# Patient Record
Sex: FEMALE | Race: WHITE | URBAN - METROPOLITAN AREA
[De-identification: names, ages, dates, MRNs, and addresses within clinical notes are randomized per-mention and may not be internally consistent; named-entity substitution may affect disease eponyms.]

---

## 2017-07-26 NOTE — PROGRESS NOTES
"  SUBJECTIVE:                                                    Christy Rodriguez is a 47 year old female who presents to clinic today for the following health issues:      Hypothyroidism Follow-up  /68  Pulse 64  Temp 98.4  F (36.9  C) (Temporal)  Resp 14  Ht 5' 2\" (1.575 m)  Wt 112 lb 4.8 oz (50.9 kg)  LMP 07/13/2017 (Approximate)  Breastfeeding? No  BMI 20.54 kg/m2      Since last visit, patient describes the following symptoms: Weight stable, no hair loss, no skin changes, no constipation, no loose stools      Amount of exercise or physical activity: 1-2 day/week for an average of 15-30 minutes    Problems taking medications regularly: No    Medication side effects: none    Diet: regular (no restrictions)    Hyperthyroid 1998 and treated.  Hypothyroid since 2000.  Last dose change from 150 mcg to 100 mcg.  Doing well overall, no questions or concerns.  Family hx of Thyroid problems.  No other positive family hx.  Denies surgical hx or other medical condition.  Mamogram done recently per patient.  Needs PAP, will call and schedule.  Patient works at home, , 3 children 17,15 and 13.  No tobacco, minimum alcohol, no drugs.  Regular menstural periods.    PROBLEMS TO ADD ON...    Problem list and histories reviewed & adjusted, as indicated.  Additional history: as documented    Patient Active Problem List   Diagnosis     Postablative hypothyroidism     History reviewed. No pertinent surgical history.    Social History   Substance Use Topics     Smoking status: Never Smoker     Smokeless tobacco: Never Used     Alcohol use Yes      Comment: socially      History reviewed. No pertinent family history.      Current Outpatient Prescriptions   Medication Sig Dispense Refill     LEVOTHYROXINE SODIUM PO Take 100 mcg by mouth daily       No Known Allergies      Reviewed and updated as needed this visit by clinical staffTobacco  Allergies  Meds  Med Hx  Surg Hx  Fam Hx  Soc Hx      Reviewed and " "updated as needed this visit by Provider         ROS:  Constitutional, HEENT, cardiovascular, pulmonary, gi and gu systems are negative, except as otherwise noted.      OBJECTIVE:   /68  Pulse 64  Temp 98.4  F (36.9  C) (Temporal)  Resp 14  Ht 5' 2\" (1.575 m)  Wt 112 lb 4.8 oz (50.9 kg)  LMP 07/13/2017 (Approximate)  Breastfeeding? No  BMI 20.54 kg/m2  Body mass index is 20.54 kg/(m^2).  GENERAL: no apparent distress  EYES: Conjunctiva are not injected, no discharge.  EARS: Left TM -no erythema, no effusion,  not bulged.               Right TM -no erythema, no effusion,  not bulged.  NOSE: no discharge, no sinus tenderness  THROAT: no erythema, no exudate, no lesions  NECK: supple, no adenopathy.  CARDIAC: regular rate and rhythm, no murmur  RESP: clear, no wheezing, no rales, no rhonchi  ABD: soft, no distension, no tenderness  SKIN: normal, warm, no rash, nml skin turgor    Diagnostic Test Results:  Pending    ASSESSMENT/PLAN:     Hypothyroidism; controlled/euthyroid   Plan:  Labs:  TSH, Lipid, hgb, glucose and Vit D  Medications:  continue levothyroxine at current dose, will wait for the result    Tobacco Cessation:   reports that she has never smoked. She has never used smokeless tobacco.      BMI:   Estimated body mass index is 20.54 kg/(m^2) as calculated from the following:    Height as of this encounter: 5' 2\" (1.575 m).    Weight as of this encounter: 112 lb 4.8 oz (50.9 kg).   Per patient her weight has been stable, not worried about weight and appetite good and healthy diet.        ICD-10-CM    1. Postablative hypothyroidism E89.0 Lipid Profile (Chol, Trig, HDL, LDL calc)     LEVOTHYROXINE SODIUM PO     TSH     Hemoglobin     Glucose, whole blood     Vitamin D Deficiency     The patient understood the rational for the diagnosis and treatment plan.   All questions were answered to best of my ability and the patient's satisfaction.  I have reviewed all pertinent investigations including labs " and imaging including outside records if relevent.    Patient Instructions   Will call and update with results.  Keep up healthy life style, balance diet and healthy life style.  If any questions or concerns please notify or follow up.  Follow up TSH in 6 months if labs normal.    Thank you.    Electronically signed:  Malu Jiménez M.D.        Malu Jiménez MD  [facility]

## 2017-07-27 ENCOUNTER — OFFICE VISIT (OUTPATIENT)
Dept: FAMILY MEDICINE | Facility: OTHER | Age: 47
End: 2017-07-27
Payer: COMMERCIAL

## 2017-07-27 VITALS
DIASTOLIC BLOOD PRESSURE: 68 MMHG | SYSTOLIC BLOOD PRESSURE: 106 MMHG | RESPIRATION RATE: 14 BRPM | BODY MASS INDEX: 20.67 KG/M2 | WEIGHT: 112.3 LBS | HEIGHT: 62 IN | TEMPERATURE: 98.4 F | HEART RATE: 64 BPM

## 2017-07-27 DIAGNOSIS — E89.0 POSTABLATIVE HYPOTHYROIDISM: Primary | ICD-10-CM

## 2017-07-27 LAB
CHOLEST SERPL-MCNC: 185 MG/DL
GLUCOSE BLD-MCNC: 95 MG/DL (ref 70–99)
HDLC SERPL-MCNC: 83 MG/DL
HGB BLD-MCNC: 14.3 G/DL (ref 11.7–15.7)
LDLC SERPL CALC-MCNC: 90 MG/DL
NONHDLC SERPL-MCNC: 102 MG/DL
TRIGL SERPL-MCNC: 61 MG/DL
TSH SERPL DL<=0.05 MIU/L-ACNC: 1.8 MU/L (ref 0.4–4)

## 2017-07-27 PROCEDURE — 82306 VITAMIN D 25 HYDROXY: CPT | Performed by: FAMILY MEDICINE

## 2017-07-27 PROCEDURE — 84443 ASSAY THYROID STIM HORMONE: CPT | Performed by: FAMILY MEDICINE

## 2017-07-27 PROCEDURE — 99202 OFFICE O/P NEW SF 15 MIN: CPT | Performed by: FAMILY MEDICINE

## 2017-07-27 PROCEDURE — 82947 ASSAY GLUCOSE BLOOD QUANT: CPT | Performed by: FAMILY MEDICINE

## 2017-07-27 PROCEDURE — 85018 HEMOGLOBIN: CPT | Performed by: FAMILY MEDICINE

## 2017-07-27 PROCEDURE — 80061 LIPID PANEL: CPT | Performed by: FAMILY MEDICINE

## 2017-07-27 PROCEDURE — 36415 COLL VENOUS BLD VENIPUNCTURE: CPT | Performed by: FAMILY MEDICINE

## 2017-07-27 ASSESSMENT — PAIN SCALES - GENERAL: PAINLEVEL: NO PAIN (0)

## 2017-07-27 NOTE — NURSING NOTE
"Chief Complaint   Patient presents with     Thyroid Disease     Panel Management     pap, ldl, mychart, sameer       Initial /68  Pulse 64  Temp 98.4  F (36.9  C) (Temporal)  Resp 14  Ht 5' 2\" (1.575 m)  Wt 112 lb 4.8 oz (50.9 kg)  LMP 07/13/2017 (Approximate)  Breastfeeding? No  BMI 20.54 kg/m2 Estimated body mass index is 20.54 kg/(m^2) as calculated from the following:    Height as of this encounter: 5' 2\" (1.575 m).    Weight as of this encounter: 112 lb 4.8 oz (50.9 kg).  Medication Reconciliation: complete     Felicia Clark MA    "

## 2017-07-27 NOTE — PATIENT INSTRUCTIONS
Will call and update with results.  Keep up healthy life style, balance diet and healthy life style.  If any questions or concerns please notify or follow up.  Follow up TSH in 6 months if labs normal.    Thank you.    Electronically signed:  Malu Jiménez M.D.

## 2017-07-27 NOTE — MR AVS SNAPSHOT
"              After Visit Summary   7/27/2017    Christy Rodriguez    MRN: [id number]           Patient Information     Date Of Birth          1970        Visit Information        Provider Department      7/27/2017 8:10 AM Malu Jiménez MD [facility]        Today's Diagnoses     Postablative hypothyroidism    -  1      Care Instructions    Will call and update with results.  Keep up healthy life style, balance diet and healthy life style.  If any questions or concerns please notify or follow up.  Follow up TSH in 6 months if labs normal.    Thank you.    Electronically signed:  Malu Jiménez M.D.            Follow-ups after your visit        Who to contact     If you have questions or need follow up information about today's clinic visit or your schedule please contact [facility] directly at [phone].  Normal or non-critical lab and imaging results will be communicated to you by MyChart, letter or phone within 4 business days after the clinic has received the results. If you do not hear from us within 7 days, please contact the clinic through MyChart or phone. If you have a critical or abnormal lab result, we will notify you by phone as soon as possible.  Submit refill requests through Ryonet or call your pharmacy and they will forward the refill request to us. Please allow 3 business days for your refill to be completed.          Additional Information About Your Visit        MyChart Information     Ryonet lets you send messages to your doctor, view your test results, renew your prescriptions, schedule appointments and more. To sign up, go to [email or web address] . Click on \"Log in\" on the left side of the screen, which will take you to the Welcome page. Then click on \"Sign up Now\" on the right side of the page.     You will be asked to enter the access code listed below, as well as some personal information. Please follow the directions to create your " "username and password.     Your access code is: [email or web address]  Expires: 10/25/2017  8:37 AM     Your access code will  in 90 days. If you need help or a new code, please call your [facility] or [phone].        Care EveryWhere ID     This is your Care EveryWhere ID. This could be used by other organizations to access your [place] medical records  [id number]        Your Vitals Were     Pulse Temperature Respirations Height Last Period Breastfeeding?    64 98.4  F (36.9  C) (Temporal) 14 5' 2\" (1.575 m) 2017 (Approximate) No    BMI (Body Mass Index)                   20.54 kg/m2            Blood Pressure from Last 3 Encounters:   17 106/68    Weight from Last 3 Encounters:   17 112 lb 4.8 oz (50.9 kg)              We Performed the Following     Glucose, whole blood     Hemoglobin     Lipid Profile (Chol, Trig, HDL, LDL calc)     TSH        Primary Care Provider Office Phone # Fax #    Malu Jiménez [phone]       [facility] [address] [facility] [address]        Equal Access to Services     LOVE GONZÁLES : Hadii mello ku hadasho Soomaali, waaxda luqadaha, qaybta kaalmada adeegyada, ilir grewal. So [facility] [phone].    ATENCIÓN: Si habla español, tiene a valle disposición servicios gratuitos de asistencia lingüística. Llame al [phone].    We comply with applicable federal civil rights laws and [place] laws. We do not discriminate on the basis of race, color, national origin, age, disability sex, sexual orientation or gender identity.            Thank you!     Thank you for choosing [facility]  for your care. Our goal is always to provide you with excellent care. Hearing back from our patients is one way we can continue to improve our services. Please take a few minutes to complete the written survey that you may receive in the mail after your visit with us. Thank you!             Your Updated " Medication List - Protect others around you: Learn how to safely use, store and throw away your medicines at www.disposemymeds.org.          This list is accurate as of: 7/27/17  8:37 AM.  Always use your most recent med list.                   Brand Name Dispense Instructions for use Diagnosis    LEVOTHYROXINE SODIUM PO      Take 100 mcg by mouth daily    Postablative hypothyroidism

## 2017-07-28 LAB — DEPRECATED CALCIDIOL+CALCIFEROL SERPL-MC: 28 UG/L (ref 20–75)

## 2017-07-28 RX ORDER — LEVOTHYROXINE SODIUM 100 UG/1
100 TABLET ORAL DAILY
Qty: 90 TABLET | Refills: 3 | Status: SHIPPED | OUTPATIENT
Start: 2017-07-28 | End: 2018-08-20

## 2017-07-29 ENCOUNTER — HEALTH MAINTENANCE LETTER (OUTPATIENT)
Age: 47
End: 2017-07-29

## 2017-09-12 ENCOUNTER — TELEPHONE (OUTPATIENT)
Dept: FAMILY MEDICINE | Facility: OTHER | Age: 47
End: 2017-09-12

## 2017-09-12 NOTE — TELEPHONE ENCOUNTER
Summary:    Patient is due/failing the following:   PAP and Physical     Action needed:   Patient needs office visit for PAP.    Type of outreach:    Phone, spoke to patient.  patient scheduled    Questions for provider review:    None                                                                                                                                    Aster Reeder       Chart routed to Care Team .        Panel Management Review      Patient has the following on her problem list: None      Composite cancer screening  Chart review shows that this patient is due/due soon for the following Pap Smear

## 2017-09-15 NOTE — PROGRESS NOTES
SUBJECTIVE:   CC: Christy Rodriguez is an 47 year old woman who presents for preventive health visit.     Physical   Annual:     Getting at least 3 servings of Calcium per day::  NO    Bi-annual eye exam::  Yes    Dental care twice a year::  Yes    Sleep apnea or symptoms of sleep apnea::  None    Diet::  Regular (no restrictions)    Frequency of exercise::  2-3 days/week    Duration of exercise::  30-45 minutes    Taking medications regularly::  Yes    Medication side effects::  None    Additional concerns today::  No            Today's PHQ-2 Score: No flowsheet data found.    Abuse: Current or Past(Physical, Sexual or Emotional)- No  Do you feel safe in your environment - Yes    Social History   Substance Use Topics     Smoking status: Never Smoker     Smokeless tobacco: Never Used     Alcohol use Yes      Comment: socially      The patient does not drink >3 drinks per day nor >7 drinks per week.    Reviewed orders with patient.  Reviewed health maintenance and updated orders accordingly - Yes  Labs reviewed in EPIC  BP Readings from Last 3 Encounters:   09/26/17 90/62   07/27/17 106/68    Wt Readings from Last 3 Encounters:   09/26/17 114 lb 3.2 oz (51.8 kg)   07/27/17 112 lb 4.8 oz (50.9 kg)                  Patient Active Problem List   Diagnosis     Postablative hypothyroidism     Past Surgical History:   Procedure Laterality Date     C THYROID ABLATION       HC TOOTH EXTRACTION W/FORCEP       TONSILLECTOMY & ADENOIDECTOMY         Social History   Substance Use Topics     Smoking status: Never Smoker     Smokeless tobacco: Never Used     Alcohol use Yes      Comment: socially      No family history on file.      Current Outpatient Prescriptions   Medication Sig Dispense Refill     levothyroxine (SYNTHROID/LEVOTHROID) 100 MCG tablet Take 1 tablet (100 mcg) by mouth daily 90 tablet 3     [DISCONTINUED] LEVOTHYROXINE SODIUM PO Take 100 mcg by mouth daily             Patient under age 50, mutual decision  reflected in health maintenance.        Pertinent mammograms are reviewed under the imaging tab.  History of abnormal Pap smear: Last 3 Pap Results: No results found for: PAP    Reviewed and updated as needed this visit by clinical staff         Reviewed and updated as needed this visit by Provider        Past Medical History:   Diagnosis Date     Postablative hypothyroidism 7/27/2017      Past Surgical History:   Procedure Laterality Date     C THYROID ABLATION       HC TOOTH EXTRACTION W/FORCEP       TONSILLECTOMY & ADENOIDECTOMY         ROS:  C: NEGATIVE for fever, chills, change in weight  I: NEGATIVE for worrisome rashes, moles or lesions  E: NEGATIVE for vision changes or irritation  ENT: NEGATIVE for ear, mouth and throat problems  R: NEGATIVE for significant cough or SOB  B: NEGATIVE for masses, tenderness or discharge  CV: NEGATIVE for chest pain, palpitations or peripheral edema  GI: NEGATIVE for nausea, abdominal pain, heartburn, or change in bowel habits  : NEGATIVE for unusual urinary or vaginal symptoms. Periods are regular.  M: NEGATIVE for significant arthralgias or myalgia  N: NEGATIVE for weakness, dizziness or paresthesias  P: NEGATIVE for changes in mood or affect     OBJECTIVE:   There were no vitals taken for this visit.  EXAM:  GENERAL: healthy, alert and no distress  EYES: Eyes grossly normal to inspection, PERRL and conjunctivae and sclerae normal  HENT: ear canals and TM's normal, nose and mouth without ulcers or lesions  NECK: no adenopathy, no asymmetry, masses, or scars and thyroid normal to palpation  RESP: lungs clear to auscultation - no rales, rhonchi or wheezes  BREAST: normal without masses, tenderness or nipple discharge and no palpable axillary masses or adenopathy  CV: regular rate and rhythm, normal S1 S2, no S3 or S4, no murmur, click or rub, no peripheral edema and peripheral pulses strong  ABDOMEN: soft, nontender, no hepatosplenomegaly, no masses and bowel sounds  "normal   (female): normal female external genitalia, normal urethral meatus , vaginal mucosa pink, moist, well rugated and Bartholin's gland friable cervix is noted at this time.  MS: no gross musculoskeletal defects noted, no edema  SKIN: no suspicious lesions or rashes  NEURO: Normal strength and tone, mentation intact and speech normal  PSYCH: mentation appears normal, affect normal/bright    ASSESSMENT/PLAN:   1. Postablative hypothyroidism  Needs labs in February.      COUNSELING:  Reviewed preventive health counseling, as reflected in patient instructions       Regular exercise       Healthy diet/nutrition       Vision screening       Hearing screening         reports that she has never smoked. She has never used smokeless tobacco.    Estimated body mass index is 20.54 kg/(m^2) as calculated from the following:    Height as of 7/27/17: 5' 2\" (1.575 m).    Weight as of 7/27/17: 112 lb 4.8 oz (50.9 kg).         Counseling Resources:  ATP IV Guidelines  Pooled Cohorts Equation Calculator  Breast Cancer Risk Calculator  FRAX Risk Assessment  ICSI Preventive Guidelines  Dietary Guidelines for Americans, 2010  USDA's MyPlate  ASA Prophylaxis  Lung CA Screening    Raf Cardenas PA-C  [facility] for HPI/ROS submitted by the patient on 9/26/2017   PHQ-2 Score: 0    "

## 2017-09-26 ENCOUNTER — OFFICE VISIT (OUTPATIENT)
Dept: FAMILY MEDICINE | Facility: OTHER | Age: 47
End: 2017-09-26
Payer: COMMERCIAL

## 2017-09-26 VITALS
BODY MASS INDEX: 21.02 KG/M2 | HEART RATE: 64 BPM | DIASTOLIC BLOOD PRESSURE: 62 MMHG | SYSTOLIC BLOOD PRESSURE: 90 MMHG | HEIGHT: 62 IN | RESPIRATION RATE: 18 BRPM | WEIGHT: 114.2 LBS

## 2017-09-26 DIAGNOSIS — Z01.419 WELL WOMAN EXAM WITH ROUTINE GYNECOLOGICAL EXAM: ICD-10-CM

## 2017-09-26 DIAGNOSIS — E89.0 POSTABLATIVE HYPOTHYROIDISM: Primary | ICD-10-CM

## 2017-09-26 LAB
SPECIMEN SOURCE: NORMAL
WET PREP SPEC: NORMAL

## 2017-09-26 PROCEDURE — G0145 SCR C/V CYTO,THINLAYER,RESCR: HCPCS | Performed by: PHYSICIAN ASSISTANT

## 2017-09-26 PROCEDURE — 87210 SMEAR WET MOUNT SALINE/INK: CPT | Performed by: PHYSICIAN ASSISTANT

## 2017-09-26 PROCEDURE — 87624 HPV HI-RISK TYP POOLED RSLT: CPT | Performed by: PHYSICIAN ASSISTANT

## 2017-09-26 PROCEDURE — 99396 PREV VISIT EST AGE 40-64: CPT | Performed by: PHYSICIAN ASSISTANT

## 2017-09-26 ASSESSMENT — PAIN SCALES - GENERAL: PAINLEVEL: NO PAIN (0)

## 2017-09-26 NOTE — MR AVS SNAPSHOT
After Visit Summary   9/26/2017    Christy Rodriguez    MRN: [id number]           Patient Information     Date Of Birth          1970        Visit Information        Provider Department      9/26/2017 3:00 PM Raf Guerrero PA-C [facility]'s Diagnoses     Postablative hypothyroidism          Care Instructions      Preventive Health Recommendations  Female Ages 40 to 49    Yearly exam:     See your health care provider every year in order to  1. Review health changes.   2. Discuss preventive care.    3. Review your medicines if your doctor prescribed any.      Get a Pap test every three years (unless you have an abnormal result and your provider advises testing more often).      If you get Pap tests with HPV test, you only need to test every 5 years, unless you have an abnormal result. You do not need a Pap test if your uterus was removed (hysterectomy) and you have not had cancer.      You should be tested each year for STDs (sexually transmitted diseases), if you're at risk.       Ask your doctor if you should have a mammogram.      Have a colonoscopy (test for colon cancer) if someone in your family has had colon cancer or polyps before age 50.       Have a cholesterol test every 5 years.       Have a diabetes test (fasting glucose) after age 45. If you are at risk for diabetes, you should have this test every 3 years.    Shots: Get a flu shot each year. Get a tetanus shot every 10 years.     Nutrition:     Eat at least 5 servings of fruits and vegetables each day.    Eat whole-grain bread, whole-wheat pasta and brown rice instead of white grains and rice.    Talk to your provider about Calcium and Vitamin D.     Lifestyle    Exercise at least 150 minutes a week (an average of 30 minutes a day, 5 days a week). This will help you control your weight and prevent disease.    Limit alcohol to one drink per day.    No smoking.     Wear sunscreen to prevent skin  "cancer.    See your dentist every six months for an exam and cleaning.          Follow-ups after your visit        Who to contact     If you have questions or need follow up information about today's clinic visit or your schedule please contact [facility] directly at [phone].  Normal or non-critical lab and imaging results will be communicated to you by MyChart, letter or phone within 4 business days after the clinic has received the results. If you do not hear from us within 7 days, please contact the clinic through MyChart or phone. If you have a critical or abnormal lab result, we will notify you by phone as soon as possible.  Submit refill requests through IQ Elite or call your pharmacy and they will forward the refill request to us. Please allow 3 business days for your refill to be completed.          Additional Information About Your Visit        MyChart Information     IQ Elite lets you send messages to your doctor, view your test results, renew your prescriptions, schedule appointments and more. To sign up, go to [email or web address] . Click on \"Log in\" on the left side of the screen, which will take you to the Welcome page. Then click on \"Sign up Now\" on the right side of the page.     You will be asked to enter the access code listed below, as well as some personal information. Please follow the directions to create your username and password.     Your access code is: [email or web address]  Expires: 10/25/2017  8:37 AM     Your access code will  in 90 days. If you need help or a new code, please call your [place] clinic or [phone].        Care EveryWhere ID     This is your Care EveryWhere ID. This could be used by other organizations to access your [place] medical records  [id number]        Your Vitals Were     Pulse Respirations Height Last Period BMI (Body Mass Index)       64 18 5' 1.69\" (1.567 m) 2017 (Approximate) 21.1 kg/m2        Blood Pressure from Last 3 " Encounters:   09/26/17 90/62   07/27/17 106/68    Weight from Last 3 Encounters:   09/26/17 114 lb 3.2 oz (51.8 kg)   07/27/17 112 lb 4.8 oz (50.9 kg)              Today, you had the following     No orders found for display       Primary Care Provider Office Phone # Fax #    Raf Guerrero PA-C [phone]       [address] [facility] [address]        Equal Access to Services     LOVE GONZÁLES : Hadii aad ku hadasho Soomaali, waaxda luqadaha, qaybta kaalmada adeegyada, waxay idiin hayaan adeeg justin becker . So [facility] [phone].    ATENCIÓN: Si habla español, tiene a valle disposición servicios gratuitos de asistencia lingüística. [facility] [phone].    We comply with applicable federal civil rights laws and [place] laws. We do not discriminate on the basis of race, color, national origin, age, disability sex, sexual orientation or gender identity.            Thank you!     Thank you for choosing [facility]  for your care. Our goal is always to provide you with excellent care. Hearing back from our patients is one way we can continue to improve our services. Please take a few minutes to complete the written survey that you may receive in the mail after your visit with us. Thank you!             Your Updated Medication List - Protect others around you: Learn how to safely use, store and throw away your medicines at www.disposemymeds.org.          This list is accurate as of: 9/26/17  3:32 PM.  Always use your most recent med list.                   Brand Name Dispense Instructions for use Diagnosis    levothyroxine 100 MCG tablet    SYNTHROID/LEVOTHROID    90 tablet    Take 1 tablet (100 mcg) by mouth daily    Postablative hypothyroidism

## 2017-09-26 NOTE — LETTER
October 6, 2017    Christy Rodriguez     [facility] [address]    Dear Christy,  We are happy to inform you that your PAP smear result from 09/26/17 is normal.  We are now able to do a follow up test on PAP smears. The DNA test is for HPV (Human Papilloma Virus). Cervical cancer is closely linked with certain types of HPV. Your result showed no evidence of high risk HPV.  Therefore we recommend you return in 3 years for your next pap smear.  You will still need to return to the clinic every year for an annual exam and other preventive tests.  Please contact the clinic at [phone] with any questions.  Sincerely,    Raf Cardenas PA-C/[place]

## 2017-09-26 NOTE — NURSING NOTE
"Chief Complaint   Patient presents with     Physical     Panel Management     MyChart, Flu shot, Pap       Initial BP 90/62 (Cuff Size: Adult Regular)  Pulse 64  Resp 18  Ht 5' 1.69\" (1.567 m)  Wt 114 lb 3.2 oz (51.8 kg)  LMP 09/20/2017 (Approximate)  BMI 21.1 kg/m2 Estimated body mass index is 21.1 kg/(m^2) as calculated from the following:    Height as of this encounter: 5' 1.69\" (1.567 m).    Weight as of this encounter: 114 lb 3.2 oz (51.8 kg).  Medication Reconciliation: complete   Elizabeth Brumfield CMA (AAMA)    "

## 2017-09-29 LAB
COPATH REPORT: NORMAL
PAP: NORMAL

## 2017-10-02 LAB
FINAL DIAGNOSIS: NORMAL
HPV HR 12 DNA CVX QL NAA+PROBE: NEGATIVE
HPV16 DNA SPEC QL NAA+PROBE: NEGATIVE
HPV18 DNA SPEC QL NAA+PROBE: NEGATIVE
SPECIMEN DESCRIPTION: NORMAL

## 2018-02-09 ENCOUNTER — TELEPHONE (OUTPATIENT)
Dept: FAMILY MEDICINE | Facility: OTHER | Age: 48
End: 2018-02-09

## 2018-02-09 NOTE — TELEPHONE ENCOUNTER
Panel Management Review      Patient has the following on her problem list: None      Composite cancer screening  Chart review shows that this patient is due/due soon for the following None  Summary:    Patient is due/failing the following:   Thyroid labs and FOLLOW UP    Action needed:   Patient needs office visit for recheck of thyroid. Due 3/2018, please help pt schedule.  Patient needs non-fasting lab only appointment, due now. Orders placed.    Type of outreach:    Spoke with pt and she stated she had a change in her insurance and needs to check if [place] is still within network. Will call back to schedule.    Questions for provider review:    None                                                                                                                                    Heide Yusuf CMA (AAMA)       Chart routed to Care Team .

## 2018-03-27 ENCOUNTER — TELEPHONE (OUTPATIENT)
Dept: FAMILY MEDICINE | Facility: OTHER | Age: 48
End: 2018-03-27

## 2018-03-27 NOTE — TELEPHONE ENCOUNTER
Panel Management Review      Patient has the following on her problem list: None      Composite cancer screening  Chart review shows that this patient is due/due soon for the following None  Summary:    Patient is due/failing the following:   TSH, T3    Action needed:   Patient needs non-fasting lab only appointment    Type of outreach:    Phone, spoke to patient.  Patient declined to make appointment, states she needs to see if her insurance still covers her visits to [place]    Questions for provider review:    None                                                                                                                                    Terri Krause [facility]     Chart routed to Care Team .

## 2018-08-20 DIAGNOSIS — E89.0 POSTABLATIVE HYPOTHYROIDISM: ICD-10-CM

## 2018-08-21 RX ORDER — LEVOTHYROXINE SODIUM 100 UG/1
100 TABLET ORAL DAILY
Qty: 30 TABLET | Refills: 0 | Status: SHIPPED | OUTPATIENT
Start: 2018-08-21

## 2018-08-21 NOTE — TELEPHONE ENCOUNTER
"Requested Prescriptions   Pending Prescriptions Disp Refills     levothyroxine (SYNTHROID/LEVOTHROID) 100 MCG tablet 90 tablet 3     Sig: Take 1 tablet (100 mcg) by mouth daily    Thyroid Protocol Failed    8/20/2018  6:44 PM       Failed - Normal TSH on file in past 12 months    Recent Labs   Lab Test  07/27/17   0849   TSH  1.80          Passed - Patient is 12 years or older       Passed - Recent (12 mo) or future (30 days) visit within the authorizing provider's specialty    Patient had office visit in the last 12 months or has a visit in the next 30 days with authorizing provider or within the authorizing provider's specialty.  See \"Patient Info\" tab in inbasket, or \"Choose Columns\" in Meds & Orders section of the refill encounter.         Passed - No active pregnancy on record    If patient is pregnant or has had a positive pregnancy test, please check TSH.       Passed - No positive pregnancy test in past 12 months    If patient is pregnant or has had a positive pregnancy test, please check TSH.        levothyroxine (SYNTHROID/LEVOTHROID) 100 MCG tablet  Medication is being filled for 1 time refill only due to:  Patient needs to be seen because due for TSH labs, due for physical 09/26/2018 or after.     Please assist with scheduling.    Peggy Rhodes, RN, BSN         "

## 2018-08-21 NOTE — TELEPHONE ENCOUNTER
Spoke to patient and relayed message below. Patient expressed understanding. Will call to make appointment. Patient does not want to make appointment for physical, just to follow up on thyroid.

## 2018-11-12 ENCOUNTER — TELEPHONE (OUTPATIENT)
Dept: FAMILY MEDICINE | Facility: OTHER | Age: 48
End: 2018-11-12

## 2018-11-12 NOTE — LETTER
[facility]  [id number] [place] [id number]  Phone: [phone]  November 26, 2018      Christy Rodriguez  [address]   [facility] [address]      Dear Christy,    We care about your health and have reviewed your health plan including your medical conditions, medications, and lab results.  Based on this review, it is recommended that you follow up regarding the following health topic(s):  -Thyroid    We recommend you take the following action(s):  -schedule a FOLLOWUP APPOINTMENT.     Please call us at the [facility] - [phone] (or use [email or web address]) to address the above recommendations.     Thank you for trusting [facility] and we appreciate the opportunity to serve you.  We look forward to supporting your healthcare needs in the future.    Healthy Regards,    Your Health Care Team  [facility]

## 2018-11-12 NOTE — TELEPHONE ENCOUNTER
Panel Management Review      Patient has the following on her problem list: None      Composite cancer screening  Chart review shows that this patient is due/due soon for the following None  Summary:    Patient is due/failing the following:   Thyroid labs    Action needed:   Patient needs office visit for Thyroid and labs .    Type of outreach:    Phone, left message for patient to call back.     Questions for provider review:    None                                                                                                                                    Elizabeth Brumfield CMA (SAE)       Chart routed to Care Team .

## 2019-07-06 ENCOUNTER — TELEPHONE (OUTPATIENT)
Dept: SCHEDULING | Facility: CLINIC | Age: 49
End: 2019-07-06

## 2019-07-06 NOTE — TELEPHONE ENCOUNTER
Per outreach, patient is aware of overdue screening and will call on their own time for scheduling.     Screening: Preventive Health Screening MammogramKIM [email or web address] July 17th    Thanks